# Patient Record
Sex: FEMALE | Race: WHITE | URBAN - NONMETROPOLITAN AREA
[De-identification: names, ages, dates, MRNs, and addresses within clinical notes are randomized per-mention and may not be internally consistent; named-entity substitution may affect disease eponyms.]

---

## 2017-05-24 ENCOUNTER — COMMUNICATION - GICH (OUTPATIENT)
Dept: FAMILY MEDICINE | Facility: OTHER | Age: 47
End: 2017-05-24

## 2017-05-24 DIAGNOSIS — Z79.890 HORMONE REPLACEMENT THERAPY (POSTMENOPAUSAL): ICD-10-CM

## 2017-05-24 DIAGNOSIS — N95.1 FEMALE CLIMACTERIC STATE: ICD-10-CM

## 2017-05-24 DIAGNOSIS — F41.1 GENERALIZED ANXIETY DISORDER: ICD-10-CM

## 2017-08-09 ENCOUNTER — HISTORY (OUTPATIENT)
Dept: FAMILY MEDICINE | Facility: OTHER | Age: 47
End: 2017-08-09

## 2017-08-09 ENCOUNTER — OFFICE VISIT - GICH (OUTPATIENT)
Dept: FAMILY MEDICINE | Facility: OTHER | Age: 47
End: 2017-08-09

## 2017-08-09 DIAGNOSIS — F41.1 GENERALIZED ANXIETY DISORDER: ICD-10-CM

## 2017-08-09 DIAGNOSIS — Z12.11 ENCOUNTER FOR SCREENING FOR MALIGNANT NEOPLASM OF COLON: ICD-10-CM

## 2017-08-09 DIAGNOSIS — Z79.890 HORMONE REPLACEMENT THERAPY (POSTMENOPAUSAL): ICD-10-CM

## 2017-08-09 DIAGNOSIS — N95.1 FEMALE CLIMACTERIC STATE: ICD-10-CM

## 2017-08-09 DIAGNOSIS — G47.00 INSOMNIA: ICD-10-CM

## 2017-08-10 ENCOUNTER — COMMUNICATION - GICH (OUTPATIENT)
Dept: FAMILY MEDICINE | Facility: OTHER | Age: 47
End: 2017-08-10

## 2017-08-10 ENCOUNTER — HISTORY (OUTPATIENT)
Dept: FAMILY MEDICINE | Facility: OTHER | Age: 47
End: 2017-08-10

## 2017-12-28 NOTE — TELEPHONE ENCOUNTER
Patient Information     Patient Name MRN Sex Lisa Domínguez [id number] Female 1970      Telephone Encounter by Yani West at 8/10/2017  9:15 AM     Author:  Yani West Service:  (none) Author Type:  (none)     Filed:  8/10/2017  9:21 AM Encounter Date:  8/10/2017 Status:  Signed     :  Yani West            Spoke with Lisa ,  She will call in January to schedule the colonoscopy.

## 2017-12-28 NOTE — PROGRESS NOTES
"Patient Information     Patient Name MRN Sex     Lisa Dyer [id number] Female 1970      Progress Notes by Sara Geiger DO at 2017  9:15 AM     Author:  Sara Geiger DO Service:  (none) Author Type:  PHYS- Osteopathic     Filed:  8/10/2017  6:53 AM Encounter Date:  2017 Status:  Signed     :  Sara Geiger DO (PHYS- Osteopathic)            Nursing Notes:   Buffy Forman  2017  9:28 AM  Signed  Patient states she has been having a lot of trouble sleeping.  Buffy Forman LPN............................... 2017 9:21 AM      ANNUAL PHYSICAL - FEMALE    HPI: Lisa Dyer is a 47 y.o. female who presents for a yearly exam.  Concerns include:  1. Insomnia.  She continues to have difficulty sleeping.  She has been using alprazolam 0.5-1 mg by mouth at bedtime. She states when she started this medication it \"knocked me out.\" However now she describes that it takes a little bit of time for it to work, however she still does get sleepy enough to help her with sleep. She is worried about tolerance, and having to increase her dose. She states she will never get to the point where she has to take 1 mg. She would like to discuss possible other options at this time, especially to medications that would not cause increased tolerance. We reviewed in the past that she has tried trazodone, Remeron, Lunesta, Ambien, temazepam. I discussed a possible consult with a sleep specialist, however she states \"I have tried everything, and don't know what they would have to offer.\"  2. Also discussed HRT. She has been on low-dose Estrogen for the last ~5 years after her hysterectomy and resulting oophorectomy later that year.  She has never required higher dosing.  She has never gone without her estrogen. She is open to discussion re: weaning compared to last year when she threatened to buy it off the \"black market\" if she couldn't get it anymore.    No LMP recorded. Patient has had a " hysterectomy.   Contraception: s/p hyst  Risk for STI?: No  Last pap: prior to hysterectomy  Any hx of abnormal paps:  No  FH of early CA?: No; paternal aunt with colon CA at age 62.  Cholesterol/DM concerns/screening: UTD, normal.  Tobacco?: No  Calcium intake: No  DEXA: NA  Last mammo: 2016  Colonoscopy: NA; discussed being due in January at age 48.  Immunizations: Tdap 2014; recommended yearly flu vaccine.  Also discussed Zostavax due after  - and recommended to receive it at her pharmacy.  No indication for pneumonia vaccine at this time.      Patient Active Problem List       Diagnosis  Date Noted     Controlled substance agreement signed  2015     Alprazolam 1mg #30/month  Dx: insomnia        Hormone replacement therapy  2015     Estradiol 0.5mg tablet daily  Goal: wean off at age 48        INSOMNIA, PERSISTENT  02/10/2011     ANXIETY         Past Medical History:     Diagnosis  Date     History of OCD (obsessive compulsive disorder)     patient diagnosis/was told probably body dysmorphic syndrome        Past Surgical History:      Procedure  Laterality Date     BUNIONECTOMY      right        SECTION              HERNIA REPAIR      right, inguinal       Right fourth finger repair      tendon repair       SALPINGO-OOPHORECTOMY  2014    laparoscopic       VAGINAL HYSTERECTOMY  2013    menorrhagia         Social History     Social History        Marital status:       Spouse name: N/A     Number of children:  1     Years of education:  N/A     Occupational History        [employer]     Social History Main Topics          Smoking status:   Never Smoker      Smokeless tobacco:   Never Used      Alcohol use   Yes      Comment: some       Drug use:   No      Sexual activity:   Yes      Partners:  Male      Other Topics  Concern     Not on file      Social History Narrative     The patient is .  Her spouse is blind and has diabetes.  However,  "his blindness was from a hunting accident.  She has one daughter who lives with her.  She works at [employer]       Family History       Problem   Relation Age of Onset     Hypertension  Mother      angioplasty x 3       Other  Mother      occluded vein in leg/carotid endarterectomy       Diabetes  Maternal Grandfather              Heart Disease  Paternal Grandmother      Thyroid Disease  Maternal Aunt      hypothyroid       Other  Brother      anxiety       Cancer-breast  Paternal Aunt        Current Outpatient Prescriptions       Medication  Sig Dispense Refill     ALPRAZolam (XANAX) 1 mg tablet Take 1 tablet by mouth at bedtime. For insomnia. 30 tablet 5     estradiol (ESTRACE) 0.5 mg tablet Take 1 tablet by mouth once daily. 90 tablet 4     FLUoxetine (PROZAC) 10 mg capsule Take 3 capsules by mouth every morning. 270 capsule 4     No current facility-administered medications for this visit.      Medications have been reviewed by me and are current to the best of my knowledge and ability.       REVIEW OF SYSTEMS:  Refer to HPI; all other systems reviewed and negative.    PHYSICAL EXAM:  /58  Pulse 60  Ht 1.683 m (5' 6.25\")  Wt 58.7 kg (129 lb 6.4 oz)  BMI 20.73 kg/m2  CONSTITUTIONAL:  Alert, cooperative, NAD.  EYES: No scleral icterus.  PERRLA.  Conjunctiva clear.  ENT/MOUTH: External ears and nose normal.  TMs normal.  Moist mucous membranes. Oropharynx clear.    ENDO: No thyromegaly or thyroid nodules.  LYMPH:  No cervical or supraclavicular LA.    BREASTS: Symmetric.  No lesions, puckering.  No palpable masses or abnormalities in breast tissue b/l.  CARDIOVASCULAR: Regular, S1, S2.  No S3 or S4.  No murmur/gallop/rub.  No peripheral edema.  RESPIRATORY: CTA bilaterally, no wheezes, rhonchi or rales.  GI: Bowel sounds wnl.  Soft, nontender, nondistended.  No masses or HSM.  No rebound or guarding.  : Vulva: normal, no lesions or discharge.  Mild atrophy noted.  Urethral meatus: normal size and " location, no lesions or discharge  Urethra: no tenderness or masses  Bladder: no fullness or tenderness  Vagina: normal appearance, no abnormal discharge, no lesions.  No evidence of large cystocele or rectocele.  Pap smear obtained: No  MSKEL: Grossly normal ROM.  No clubbing.  INTEGUMENTARY:  Warm, dry.  No rash noted on exposed skin.  NEUROLOGIC: Facies symmetric.  Grossly normal movement and tone.  No tremor.  PSYCHIATRIC: Affect normal.  Speech fluent.      PHQ Depression Screen  Date of PHQ exam: 08/09/17  Over the last 2 weeks, how often have you been bothered by any of the following problems?  1. Little interest or pleasure in doing things: 0 - Not at all  2. Feeling down, depressed, or hopeless: 0 - Not at all      ASSESSMENT/PLAN:    ICD-10-CM    1. Hormone replacement therapy Z79.890 estradiol (ESTRACE) 0.5 mg tablet   2. Insomnia, unspecified type G47.00 ALPRAZolam (XANAX) 1 mg tablet   3. Generalized anxiety disorder F41.1 FLUoxetine (PROZAC) 10 mg capsule   4. Menopausal syndrome (hot flashes) N95.1 FLUoxetine (PROZAC) 10 mg capsule       Relevant cancer screening discussed.  Colonoscopy ordered - to be scheduled in JANUARY.  Will get letter from Radiology soon re: mammogram due next month.  Counseled on healthy diet, Calcium and vitamin D intake, and   exercise.    HRT, chronic, stable:  Discussed weaning within the next few years.  I recommended cutting down in half daily and if tolerates it well, could go off completely due to her low dose.  She states she has some stressful things to think about coming up - they have considered moving and want to get through some of the decisions before she worries about her wean.    Insomnia, chronic, unchanged:  It was discussed that other sleep aids are nearly just as similar for tolerance building as the benzodiazepines.  We could consider an amitriptyline type medication, however for now - recommended to stay on her Xanax at bedtime.      Anxiety, chronic,  stable: most symptoms stem from having to do all of the yard work and other household responsibilities due to her 's blindness (hunting accident).  Stable for now, will resume same dose of her Prozac.      JENNIFER POMPA, DO

## 2017-12-30 NOTE — NURSING NOTE
Patient Information     Patient Name MRN Lisa Mcfarlane [id number] Female 1970      Nursing Note by Buffy Forman at 2017  9:15 AM     Author:  Buffy Forman Service:  (none) Author Type:  (none)     Filed:  2017  9:28 AM Encounter Date:  2017 Status:  Signed     :  Buffy Forman            Patient states she has been having a lot of trouble sleeping.  Buffy Forman LPN............................... 2017 9:21 AM

## 2018-01-02 ENCOUNTER — COMMUNICATION - GICH (OUTPATIENT)
Dept: SURGERY | Facility: OTHER | Age: 48
End: 2018-01-02

## 2018-01-02 DIAGNOSIS — Z00.00 ENCOUNTER FOR GENERAL ADULT MEDICAL EXAMINATION WITHOUT ABNORMAL FINDINGS: ICD-10-CM

## 2018-01-05 NOTE — TELEPHONE ENCOUNTER
Patient Information     Patient Name MRN Sex Lisa Domínguez [id number] Female 1970      Telephone Encounter by Ellie Hyatt RN at 2017  3:52 PM     Author:  Ellie Hyatt RN Service:  (none) Author Type:  NURS- Registered Nurse     Filed:  2017  3:55 PM Encounter Date:  2017 Status:  Signed     :  Ellie Hyatt RN (NURS- Registered Nurse)            Depression-in adults 18 and over  SSRI  Office visit in the past 12 months or as indicated in chart.  Should have clinic visit 1-2 months after initial prescription.  Last visit with JENNIFER POMPA was on: 2016 in [facility]  Next visit with JENNIFER POMPA is on: No future appointment listed with this provider  Next visit with Family Practice is on: No future appointment listed in this department  Max refills 12 months from last office visit or per providers notes.   Due for exam.  Limited refill per protocol and letter mailed.  Ellie Hyatt RN ........   2017    3:53 PM    Hormones  Office visit in the past 12 months or per provider note.  Last visit with JENNIFER POMPA was on: 2016 in [facility]  Next visit with JENNIFER POMPA is on: No future appointment listed with this provider  Next visit with Family Practice is on: No future appointment listed in this department  Max refill for 12 months from last office visit or per provider note.  Due for exam.  Limited refill per protocol and letter mailed.  Ellie Hyatt RN ........   2017    3:53 PM

## 2018-01-27 VITALS
BODY MASS INDEX: 20.79 KG/M2 | HEART RATE: 60 BPM | WEIGHT: 129.4 LBS | SYSTOLIC BLOOD PRESSURE: 100 MMHG | HEIGHT: 66 IN | DIASTOLIC BLOOD PRESSURE: 58 MMHG

## 2018-02-12 NOTE — TELEPHONE ENCOUNTER
Patient Information     Patient Name MRN Sex Lisa Domínguez [id number] Female 1970      Telephone Encounter by Yani West at 2018  1:19 PM     Author:  Yani West Service:  (none) Author Type:  (none)     Filed:  2018  1:21 PM Encounter Date:  2018 Status:  Signed     :  Yani West            Screening Questions for the Scheduling of Screening Colonoscopies   (If Colonoscopy is diagnostic, Provider should review the chart before scheduling.)  Are you younger than 50 or older than 80?  NO   Do you take aspirin or fish oil?  NO (if yes, tell patient to stop 1 week prior to Colonoscopy)  Do you take warfarin (Coumadin), clopidogrel (Plavix), apixaban (Eliquis), dabigatram (Pradaxa), rivaroxaban (Xarelto) or any blood thinner? NO  Do you use oxygen at home?  NO  Do you have kidney disease? NO  Are you on dialysis? NO  Have you had a stroke or heart attack in the last year? NO  Have you had a stent in your heart or any blood vessel in the last year? NO  Have you had a transplant of any organ? NO  Have you had a colonoscopy or upper endoscopy (EGD) before? NO          When?  NO   Date of scheduled Colonoscopy. 2018  Provider ROGERIO Perez GLOBE

## 2018-03-24 ENCOUNTER — HEALTH MAINTENANCE LETTER (OUTPATIENT)
Age: 48
End: 2018-03-24

## 2018-04-03 DIAGNOSIS — G47.00 INSOMNIA, UNSPECIFIED TYPE: Primary | ICD-10-CM

## 2018-04-04 RX ORDER — ALPRAZOLAM 1 MG
TABLET ORAL
Qty: 30 TABLET | Refills: 5 | Status: SHIPPED | OUTPATIENT
Start: 2018-04-04 | End: 2018-09-06

## 2018-04-04 NOTE — TELEPHONE ENCOUNTER
Xanax 1 mg  LOV-08/09/2017    Routing refill request to provider for review/approval because: Drug not on the G refill protocol     Unable to complete prescription refill per RNMedication Refill Policy.................... Ellie Hyatt ....................  4/4/2018   4:11 PM

## 2018-04-23 RX ORDER — FLUOXETINE 10 MG/1
CAPSULE ORAL
COMMUNITY
Start: 2017-08-09 | End: 2018-09-06

## 2018-04-23 RX ORDER — ESTRADIOL 0.5 MG/1
0.5 TABLET ORAL
COMMUNITY
Start: 2017-08-09 | End: 2018-09-06

## 2018-04-25 PROBLEM — Z00.00 HEALTHCARE MAINTENANCE: Status: ACTIVE | Noted: 2018-04-25

## 2018-04-25 PROBLEM — F41.1 ANXIETY STATE: Status: ACTIVE | Noted: 2018-04-25

## 2018-04-30 ENCOUNTER — ANESTHESIA EVENT (OUTPATIENT)
Dept: SURGERY | Facility: OTHER | Age: 48
End: 2018-04-30
Payer: COMMERCIAL

## 2018-04-30 ENCOUNTER — SURGERY (OUTPATIENT)
Age: 48
End: 2018-04-30

## 2018-04-30 ENCOUNTER — HOSPITAL ENCOUNTER (OUTPATIENT)
Facility: OTHER | Age: 48
Discharge: HOME | End: 2018-04-30
Attending: SURGERY | Admitting: SURGERY
Payer: COMMERCIAL

## 2018-04-30 ENCOUNTER — ANESTHESIA (OUTPATIENT)
Dept: SURGERY | Facility: OTHER | Age: 48
End: 2018-04-30
Payer: COMMERCIAL

## 2018-04-30 VITALS
WEIGHT: 120 LBS | RESPIRATION RATE: 16 BRPM | OXYGEN SATURATION: 98 % | BODY MASS INDEX: 19.29 KG/M2 | SYSTOLIC BLOOD PRESSURE: 120 MMHG | HEART RATE: 55 BPM | HEIGHT: 66 IN | DIASTOLIC BLOOD PRESSURE: 71 MMHG | TEMPERATURE: 97 F

## 2018-04-30 PROBLEM — K63.5 COLON POLYPS: Status: ACTIVE | Noted: 2018-04-30

## 2018-04-30 PROCEDURE — 88305 TISSUE EXAM BY PATHOLOGIST: CPT

## 2018-04-30 PROCEDURE — 25000128 H RX IP 250 OP 636: Performed by: NURSE ANESTHETIST, CERTIFIED REGISTERED

## 2018-04-30 PROCEDURE — 25000125 ZZHC RX 250: Performed by: NURSE ANESTHETIST, CERTIFIED REGISTERED

## 2018-04-30 PROCEDURE — 45384 COLONOSCOPY W/LESION REMOVAL: CPT | Mod: PT | Performed by: SURGERY

## 2018-04-30 PROCEDURE — 45380 COLONOSCOPY AND BIOPSY: CPT | Performed by: SURGERY

## 2018-04-30 PROCEDURE — 40000010 ZZH STATISTIC ANES STAT CODE-CRNA PER MINUTE: Performed by: SURGERY

## 2018-04-30 PROCEDURE — 25000128 H RX IP 250 OP 636: Performed by: SURGERY

## 2018-04-30 PROCEDURE — 27210995 ZZH RX 272: Performed by: SURGERY

## 2018-04-30 PROCEDURE — 25000132 ZZH RX MED GY IP 250 OP 250 PS 637: Performed by: SURGERY

## 2018-04-30 PROCEDURE — 45384 COLONOSCOPY W/LESION REMOVAL: CPT | Performed by: NURSE ANESTHETIST, CERTIFIED REGISTERED

## 2018-04-30 RX ORDER — PROPOFOL 10 MG/ML
INJECTION, EMULSION INTRAVENOUS CONTINUOUS PRN
Status: DISCONTINUED | OUTPATIENT
Start: 2018-04-30 | End: 2018-04-30

## 2018-04-30 RX ORDER — SODIUM CHLORIDE, SODIUM LACTATE, POTASSIUM CHLORIDE, CALCIUM CHLORIDE 600; 310; 30; 20 MG/100ML; MG/100ML; MG/100ML; MG/100ML
INJECTION, SOLUTION INTRAVENOUS CONTINUOUS
Status: DISCONTINUED | OUTPATIENT
Start: 2018-04-30 | End: 2018-04-30 | Stop reason: HOSPADM

## 2018-04-30 RX ORDER — PROPOFOL 10 MG/ML
INJECTION, EMULSION INTRAVENOUS PRN
Status: DISCONTINUED | OUTPATIENT
Start: 2018-04-30 | End: 2018-04-30

## 2018-04-30 RX ORDER — LIDOCAINE HYDROCHLORIDE 20 MG/ML
INJECTION, SOLUTION INFILTRATION; PERINEURAL PRN
Status: DISCONTINUED | OUTPATIENT
Start: 2018-04-30 | End: 2018-04-30

## 2018-04-30 RX ORDER — ONDANSETRON 2 MG/ML
4 INJECTION INTRAMUSCULAR; INTRAVENOUS
Status: DISCONTINUED | OUTPATIENT
Start: 2018-04-30 | End: 2018-04-30 | Stop reason: HOSPADM

## 2018-04-30 RX ORDER — SIMETHICONE
LIQUID (ML) MISCELLANEOUS PRN
Status: DISCONTINUED | OUTPATIENT
Start: 2018-04-30 | End: 2018-04-30 | Stop reason: HOSPADM

## 2018-04-30 RX ORDER — NALOXONE HYDROCHLORIDE 0.4 MG/ML
.1-.4 INJECTION, SOLUTION INTRAMUSCULAR; INTRAVENOUS; SUBCUTANEOUS
Status: DISCONTINUED | OUTPATIENT
Start: 2018-04-30 | End: 2018-04-30 | Stop reason: HOSPADM

## 2018-04-30 RX ORDER — LIDOCAINE 40 MG/G
CREAM TOPICAL
Status: DISCONTINUED | OUTPATIENT
Start: 2018-04-30 | End: 2018-04-30 | Stop reason: HOSPADM

## 2018-04-30 RX ORDER — FLUMAZENIL 0.1 MG/ML
0.2 INJECTION, SOLUTION INTRAVENOUS
Status: DISCONTINUED | OUTPATIENT
Start: 2018-04-30 | End: 2018-04-30 | Stop reason: HOSPADM

## 2018-04-30 RX ADMIN — WATER 150 ML: 1 IRRIGANT IRRIGATION at 07:37

## 2018-04-30 RX ADMIN — Medication 1.5 ML: at 07:35

## 2018-04-30 RX ADMIN — PROPOFOL 135 MCG/KG/MIN: 10 INJECTION, EMULSION INTRAVENOUS at 07:30

## 2018-04-30 RX ADMIN — SODIUM CHLORIDE, SODIUM LACTATE, POTASSIUM CHLORIDE, AND CALCIUM CHLORIDE: 600; 310; 30; 20 INJECTION, SOLUTION INTRAVENOUS at 07:03

## 2018-04-30 RX ADMIN — PROPOFOL 70 MG: 10 INJECTION, EMULSION INTRAVENOUS at 07:30

## 2018-04-30 RX ADMIN — LIDOCAINE HYDROCHLORIDE 40 MG: 20 INJECTION, SOLUTION INFILTRATION; PERINEURAL at 07:30

## 2018-04-30 NOTE — ANESTHESIA PREPROCEDURE EVALUATION
Anesthesia Evaluation     .             ROS/MED HX    ENT/Pulmonary:  - neg pulmonary ROS     Neurologic:  - neg neurologic ROS     Cardiovascular:  - neg cardiovascular ROS       METS/Exercise Tolerance:  >4 METS   Hematologic:  - neg hematologic  ROS       Musculoskeletal:  - neg musculoskeletal ROS       GI/Hepatic:  - neg GI/hepatic ROS       Renal/Genitourinary:  - ROS Renal section negative       Endo:  - neg endo ROS       Psychiatric:     (+) psychiatric history anxiety      Infectious Disease:  - neg infectious disease ROS       Malignancy:      - no malignancy   Other:    - neg other ROS                 Physical Exam  Normal systems: cardiovascular, pulmonary and dental    Airway   Mallampati: I  TM distance: >3 FB  Neck ROM: full    Dental     Cardiovascular   Rhythm and rate: regular and normal      Pulmonary    breath sounds clear to auscultation                    Anesthesia Plan      History & Physical Review      ASA Status:  2 .    NPO Status:  > 6 hours    Plan for MAC          Postoperative Care      Consents  Anesthetic plan, risks, benefits and alternatives discussed with:  Patient..                          .

## 2018-04-30 NOTE — IP AVS SNAPSHOT
[facility]    [address]    [place] [address]    Phone:  [phone]    Fax:  [phone]                                       After Visit Summary   4/30/2018    Lisa Dyer    MRN: [id number]           After Visit Summary Signature Page     I have received my discharge instructions, and my questions have been answered. I have discussed any challenges I see with this plan with the nurse or doctor.    ..........................................................................................................................................  Patient/Patient Representative Signature      ..........................................................................................................................................  Patient Representative Print Name and Relationship to Patient    ..................................................               ................................................  Date                                            Time    ..........................................................................................................................................  Reviewed by Signature/Title    ...................................................              ..............................................  Date                                                            Time

## 2018-04-30 NOTE — OR NURSING
pt has been discharged to home at 1025 via ambulatory accompanied by neighbor    Written discharge instructions were provided to patient.  Prescriptions were N/A.      Patient and adult caring for them verbalize understanding of discharge instructions including no driving until tomorrow and no longer taking narcotic pain medications - no operating mechanical equipment and no making any important decisions.They understand reason for discharge, and necessary follow-up appointments.      Jennifer Triana RN

## 2018-04-30 NOTE — ANESTHESIA POSTPROCEDURE EVALUATION
Patient: Lisa Dyer    Procedure(s):  COLONOSCOPY - Wound Class: III-Contaminated    Diagnosis:screening  Diagnosis Additional Information: No value filed.    Anesthesia Type:  MAC    Note:  Anesthesia Post Evaluation    Patient location during evaluation: Phase 2  Patient participation: Able to fully participate in evaluation  Level of consciousness: awake and alert  Pain management: adequate  Airway patency: patent  Cardiovascular status: acceptable  Respiratory status: acceptable  Hydration status: acceptable  PONV: none             Last vitals:  Vitals:    04/30/18 0815 04/30/18 0830 04/30/18 0845   BP: 91/82 120/59    Pulse:      Resp:      Temp:      SpO2: 99% 98% 97%         Electronically Signed By: PATRICK OHPPER CRNA  April 30, 2018  8:48 AM

## 2018-04-30 NOTE — H&P
"History and Physical    CHIEF COMPLAINT / REASON FOR PROCEDURE:  Healthcare maintenance     PERTINENT HISTORY   Patient is a 48 year old female who presents today for colonoscopy for Healthcare maintenance .   Family history colon cancer in Aunt.  Patient has no complaints.    Past Medical History:   Diagnosis Date     Personal history of other mental and behavioral disorders     patient diagnosis/was told probably body dysmorphic syndrome     Past Surgical History:   Procedure Laterality Date     BUNIONECTOMY      right      SECTION      No Comments Provided     HERNIA REPAIR Right     inguinal repair     HYSTERECTOMY VAGINAL  2013,menorrhagia     OTHER SURGICAL HISTORY      [id number],OTHER,tendon repair     REPAIR TENDON FINGER(S)      right fourth finger      SALPINGO-OOPHORECTOMY BILATERAL  2014,laparoscopic       Bleeding tendencies:  No    ALLERGIES/SENSITIVITIES:   Allergies   Allergen Reactions     Meperidine Nausea and Vomiting        CURRENT MEDICATIONS:    Current Facility-Administered Medications   Medication     lactated ringers infusion     lidocaine (LMX4) cream     lidocaine 1 % 1 mL     ondansetron (ZOFRAN) injection 4 mg     sodium chloride (PF) 0.9% PF flush 3 mL     sodium chloride (PF) 0.9% PF flush 3 mL       Physical Exam:  /72  Pulse 55  Temp 98.6  F (37  C) (Temporal)  Resp 16  Ht 1.676 m (5' 6\")  Wt 54.4 kg (120 lb)  LMP   SpO2 98%  Breastfeeding? No  BMI 19.37 kg/m2  EXAM:  Chest/Respiratory Exam: Normal - Clear to auscultation without rales, rhonchi, or wheezing.  Cardiovascular Exam: normal        PLAN: COLONOSCOPY .  Patient understands risks of bleeding, perforation, potential inability to reach cecum, aspiration and wishes to proceed. MAC needed for chronic benzodiazepine use.  "

## 2018-04-30 NOTE — ANESTHESIA CARE TRANSFER NOTE
Patient: Lisa Dyer    Procedure(s):  COLONOSCOPY - Wound Class: III-Contaminated    Diagnosis: screening  Diagnosis Additional Information: No value filed.    Anesthesia Type:   MAC     Note:  Airway :Room Air  Patient transferred to:Phase II  Handoff Report: Identifed the Patient, Identified the Reponsible Provider, Reviewed the pertinent medical history, Discussed the surgical course, Reviewed Intra-OP anesthesia mangement and issues during anesthesia, Set expectations for post-procedure period and Allowed opportunity for questions and acknowledgement of understanding      Vitals: (Last set prior to Anesthesia Care Transfer)    CRNA VITALS  4/30/2018 0723 - 4/30/2018 0756      4/30/2018             Resp Rate (set): 10                Electronically Signed By: PATRICK HOPPER CRNA  April 30, 2018  7:56 AM

## 2018-04-30 NOTE — IP AVS SNAPSHOT
MRN:[id number]                      After Visit Summary   4/30/2018    Lisa Dyer    MRN: [id number]           Thank you!     Thank you for choosing [facility] for your care. Our goal is always to provide you with excellent care. Hearing back from our patients is one way we can continue to improve our services. Please take a few minutes to complete the written survey that you may receive in the mail after you visit with us. Thank you!        Patient Information     Date Of Birth          1970        About your hospital stay     You were admitted on:  April 30, 2018 You last received care in the:  [facility]    You were discharged on:  April 30, 2018       Who to Call     For medical emergencies, please call 911.  For non-urgent questions about your medical care, please call your primary care provider or clinic, [phone]  For questions related to your surgery, please call your surgery clinic        Attending Provider     Provider Specialty    Zhen Brennan MD Surgery       Primary Care Provider Office Phone # Fax #    Sara Geiger [phone]      After Care Instructions     Discharge Instructions       No driving or operating machinery until the day after procedure..postfiber            Discharge Instructions       Resume pre procedure diet and medications.  Your doctor recommends that you eat 25 to 30 Grams of fiber daily. The following are some examples of fiber amounts in different foods.    Fruits: Apple (with skin) 1 medium = 4.4 Grams   Banana      1 medium = 3.1 Grams   Oranges     1 orange = 3.1 Grams   Prunes     1 cup, pitted = 12.4 Grams    Juices: Apple, unsweetened w/ added ascorbic acid  1 cup = 0.5 Grams    Grapefruit, white, canned,sweetened  1 cup = 0.2 Grams    Grape, unsweetened w/ added ascorbic acid  1 cup = 0.5 Grams    Orange     1 cup = 0.7 Grams    Vegetables:   Cooked: Green Beans   1 cup = 4.0 Grams       Carrots    "1/2 cup sliced = 2.3 Grams       Peas       1 cup = 8.8 Grams       Potato (baked, with skin)  1 medium = 3.8 Grams    Raw: Cucumber (with peel)  1 cucumber = 1.5 Grams            Lettuce     1 cup shredded = 0.5 Grams            Tomato   1 medium tomato = 1.5 Grams            Spinach  1 cup = 0.7 Grams    Legumes: Baked beans, canned, no salt added  1 cup = 13.9 Grams         Kidney Beans, canned  1 cup = 13.6 Grams         Andujar Beans, canned     1 cup = 11.6 Grams         Lentils, boiled   1 cup = 15.6 Grams    Breads, Pastas, Flours: Bran muffins   1 medium muffin = 5.2 Grams           Oatmeal, cooked  1 cup = 4.0 Grams           White Bread   1 slice = 0.6 Grams           Whole- wheat bread = 1.9 Grams    Pasta and rice, cooked: Macaroni  1 cup = 2.5 Grams           Rice, Brown  1 cup = 3.5 Grams           Rice, white   1 cup = 0.6 Grams           Spaghetti (regular) 1 cup = 2.5 Grams    Nuts: Almonds   1 cup = 17.4 Grams            Peanuts    1 cup = 12.4 Grams            Discharge Instructions       Call [phone] for questions or concerns. Call for increased abdominal pain, rectal bleeding, persistent vomiting or fever over 101.5 F  You will receive a letter in about one week with results.                  Pending Results     No orders found from 4/28/2018 to 5/1/2018.            Admission Information     Date & Time Provider Department Dept. Phone    4/30/2018 Zhen Brennan MD [facility] [phone]      Your Vitals Were     Blood Pressure Pulse Temperature Respirations Height Weight    91/82 55 97  F (36.1  C) (Temporal) 14 1.676 m (5' 6\") 54.4 kg (120 lb)    Pulse Oximetry BMI (Body Mass Index)                99% 19.37 kg/m2          MyChart Information     [facility] lets you send messages to your doctor, view your test results, renew your prescriptions, schedule appointments and more. To sign up, go to [email or web address] . Click on \"Log in\" on the left side of the screen, which " "will take you to the Welcome page. Then click on \"Sign up Now\" on the right side of the page.     You will be asked to enter the access code listed below, as well as some personal information. Please follow the directions to create your username and password.     Your access code is: [email or web address]  Expires: 2018  8:22 AM     Your access code will  in 90 days. If you need help or a new code, please call your [facility] clinic or [phone].        Care EveryWhere ID     This is your Care EveryWhere ID. This could be used by other organizations to access your [facility] medical records  [id number]        Equal Access to Services     SAMIA ZARAGOZA : Eugene Sun, matilda castillo, tho dc, bettie huerta. So [facility] [phone].    ATENCIÓN: Si habla español, tiene a boswell disposición servicios gratuitos de asistencia lingüística. Llame al [phone].    We comply with applicable federal civil rights laws and [place] laws. We do not discriminate on the basis of race, color, national origin, age, disability, sex, sexual orientation, or gender identity.               Review of your medicines      UNREVIEWED medicines. Ask your doctor about these medicines        Dose / Directions    ALPRAZolam 1 MG tablet   Commonly known as:  XANAX   Used for:  Insomnia, unspecified type        TAKE 1 TABLET BY MOUTH NIGHTLY AT BEDTIME FOR INSOMNIA   Quantity:  30 tablet   Refills:  5       estradiol 0.5 MG tablet   Commonly known as:  ESTRACE        Dose:  0.5 mg   Take 0.5 mg by mouth   Refills:  0       FLUoxetine 10 MG capsule   Commonly known as:  PROzac        Take 3 capsules by mouth every morning.   Refills:  0                Protect others around you: Learn how to safely use, store and throw away your medicines at www.disposemymeds.org.             Medication List: This is a list of all your medications and when to take them. Check marks below indicate your " daily home schedule. Keep this list as a reference.      Medications           Morning Afternoon Evening Bedtime As Needed    ALPRAZolam 1 MG tablet   Commonly known as:  XANAX   TAKE 1 TABLET BY MOUTH NIGHTLY AT BEDTIME FOR INSOMNIA                                estradiol 0.5 MG tablet   Commonly known as:  ESTRACE   Take 0.5 mg by mouth                                FLUoxetine 10 MG capsule   Commonly known as:  PROzac   Take 3 capsules by mouth every morning.

## 2018-04-30 NOTE — OP NOTE
PROCEDURE NOTE    DATE OF SERVICE: 4/30/2018    SURGEON: Zhen Brennan MD    PRE-OP DIAGNOSIS:    Healthcare maintenance       POST-OP DIAGNOSIS:    Polyps at 15 cm     PROCEDURE:   Colonoscopy with hot biopsy    ANESTHESIA:  DANIKA Bolaños CRNA    INDICATION FOR THE PROCEDURE: The patient is a 48 year old female in need of Healthcare maintenance  . The patient has no other complaints  . After explaining the risks to include bleeding, perforation, potential inability toreach the cecum, the patient wished to proceed.    PROCEDURE:After adequate sedation, the patient was in the left lateral decubitus position.  Rectal exam was performed.  There was normal tone and no palpable masses .  The colonoscope was introduced into the rectum and advanced to the cecum with Mild difficulty.  The patient's prep was excellent.  The terminal cecum was reached.  The cecum, ascending, transverse, descending and sigmoid colon was with two diminutive polyps at 15 cm that were hot biopsied and destroyed .  The scope was retroflexed in the rectum.  The rectum was unremarkable  .  The scope was straightened and removed.  The patient tolerated the procedure well.     ESTIMATED BLOOD LOSS: none    COMPLICATIONS:  None    TISSUE REMOVED:  Yes    RECOMMEND:    Follow-up pending pathology      Zhen Brennan MD FACS

## 2018-05-02 PROBLEM — K63.5 COLON POLYPS: Status: RESOLVED | Noted: 2018-04-30 | Resolved: 2018-05-02

## 2018-07-23 NOTE — PROGRESS NOTES
Patient Information     Patient Name  Lisa Dyer MRN  [id number] Sex  Female   1970      Letter by Sara Pompa DO at      Author:  Sara Pompa DO Service:  (none) Author Type:  (none)    Filed:   Encounter Date:  2017 Status:  (Other)           Lisa Dyer  [address] [facility] [address]          May 24, 2017    Dear Ms. Dyer:    A 90 day refill of FLUoxetine (PROZAC) 10 mg capsule and Estradiol (ESTRACE) 0.5 mg tablet have been called into your pharmacy.    Additional refills require an annual office visit with SARA POMPA DO.   Please call the clinic at [phone] to schedule your appointment.    If you should require additional refills before your scheduled appointment, please contact your pharmacy and we will refill your medication until that date.      Thank you,    The Refill Nurse  [facility]

## 2018-09-06 ENCOUNTER — OFFICE VISIT (OUTPATIENT)
Dept: FAMILY MEDICINE | Facility: OTHER | Age: 48
End: 2018-09-06
Attending: FAMILY MEDICINE
Payer: COMMERCIAL

## 2018-09-06 VITALS
BODY MASS INDEX: 20.09 KG/M2 | HEIGHT: 66 IN | HEART RATE: 64 BPM | SYSTOLIC BLOOD PRESSURE: 102 MMHG | WEIGHT: 125 LBS | DIASTOLIC BLOOD PRESSURE: 70 MMHG

## 2018-09-06 DIAGNOSIS — Z00.00 ROUTINE HISTORY AND PHYSICAL EXAMINATION OF ADULT: Primary | ICD-10-CM

## 2018-09-06 DIAGNOSIS — Z13.1 SCREENING FOR DIABETES MELLITUS: ICD-10-CM

## 2018-09-06 DIAGNOSIS — N95.1 SYMPTOMATIC MENOPAUSAL OR FEMALE CLIMACTERIC STATES: ICD-10-CM

## 2018-09-06 DIAGNOSIS — F33.1 MODERATE EPISODE OF RECURRENT MAJOR DEPRESSIVE DISORDER (H): ICD-10-CM

## 2018-09-06 DIAGNOSIS — Z23 NEED FOR SHINGLES VACCINE: ICD-10-CM

## 2018-09-06 DIAGNOSIS — G47.00 INSOMNIA, UNSPECIFIED TYPE: ICD-10-CM

## 2018-09-06 DIAGNOSIS — Z13.220 LIPID SCREENING: ICD-10-CM

## 2018-09-06 DIAGNOSIS — F41.1 ANXIETY STATE: ICD-10-CM

## 2018-09-06 DIAGNOSIS — Z12.31 ENCOUNTER FOR SCREENING MAMMOGRAM FOR BREAST CANCER: ICD-10-CM

## 2018-09-06 PROBLEM — D18.01 CHERRY ANGIOMA: Status: ACTIVE | Noted: 2018-09-06

## 2018-09-06 PROBLEM — L81.4 AGE SPOTS: Status: ACTIVE | Noted: 2018-09-06

## 2018-09-06 LAB
CHOLEST SERPL-MCNC: 208 MG/DL
GLUCOSE SERPL-MCNC: 70 MG/DL (ref 70–105)
HDLC SERPL-MCNC: 85 MG/DL (ref 23–92)
LDLC SERPL CALC-MCNC: 99 MG/DL
NONHDLC SERPL-MCNC: 123 MG/DL
TRIGL SERPL-MCNC: 118 MG/DL

## 2018-09-06 PROCEDURE — 80061 LIPID PANEL: CPT | Performed by: FAMILY MEDICINE

## 2018-09-06 PROCEDURE — G0463 HOSPITAL OUTPT CLINIC VISIT: HCPCS

## 2018-09-06 PROCEDURE — 82947 ASSAY GLUCOSE BLOOD QUANT: CPT | Performed by: FAMILY MEDICINE

## 2018-09-06 PROCEDURE — 99396 PREV VISIT EST AGE 40-64: CPT | Performed by: FAMILY MEDICINE

## 2018-09-06 PROCEDURE — 36415 COLL VENOUS BLD VENIPUNCTURE: CPT | Performed by: FAMILY MEDICINE

## 2018-09-06 RX ORDER — FLUOXETINE 10 MG/1
CAPSULE ORAL
Qty: 270 CAPSULE | Refills: 4 | Status: SHIPPED | OUTPATIENT
Start: 2018-09-06

## 2018-09-06 RX ORDER — ESTRADIOL 0.5 MG/1
0.5 TABLET ORAL DAILY
Qty: 90 TABLET | Refills: 4 | Status: SHIPPED | OUTPATIENT
Start: 2018-09-06

## 2018-09-06 RX ORDER — ALPRAZOLAM 1 MG
TABLET ORAL
Qty: 30 TABLET | Refills: 5 | Status: SHIPPED | OUTPATIENT
Start: 2018-09-06 | End: 2019-01-15

## 2018-09-06 ASSESSMENT — ANXIETY QUESTIONNAIRES
2. NOT BEING ABLE TO STOP OR CONTROL WORRYING: NOT AT ALL
GAD7 TOTAL SCORE: 4
3. WORRYING TOO MUCH ABOUT DIFFERENT THINGS: SEVERAL DAYS
6. BECOMING EASILY ANNOYED OR IRRITABLE: NOT AT ALL
5. BEING SO RESTLESS THAT IT IS HARD TO SIT STILL: SEVERAL DAYS
7. FEELING AFRAID AS IF SOMETHING AWFUL MIGHT HAPPEN: SEVERAL DAYS
1. FEELING NERVOUS, ANXIOUS, OR ON EDGE: NOT AT ALL

## 2018-09-06 ASSESSMENT — PATIENT HEALTH QUESTIONNAIRE - PHQ9: 5. POOR APPETITE OR OVEREATING: SEVERAL DAYS

## 2018-09-06 NOTE — LETTER
Lisa KANG Gomez  [address]  [facility] [address]    9/7/2018      Dear Ms. Dyer,      I wanted to let you know about your recent labs.    Your total cholesterol is >200, but that is mostly because your good cholesterol (or HDL cholesterol) is great at 85!  Nothing needed at this time, we will continue to monitor every 2-3 years.  Your blood sugar level is great at 70 as well!  No concerns for diabetes at this time.    Please contact us at [phone] with any questions or concerns that you have.    I have attached your lab results for your records.        Sincerely,     Sara Geiger     Resulted Orders   Lipid Panel   Result Value Ref Range    Cholesterol 208 (H) <200 mg/dL    Triglycerides 118 <150 mg/dL    HDL Cholesterol 85 23 - 92 mg/dL    LDL Cholesterol Calculated 99 <100 mg/dL      Comment:      Desirable:       <100 mg/dl    Non HDL Cholesterol 123 <130 mg/dL   Glucose   Result Value Ref Range    Glucose 70 70 - 105 mg/dL

## 2018-09-06 NOTE — NURSING NOTE
"Chief Complaint   Patient presents with     Physical       Initial /70 (BP Location: Right arm, Patient Position: Sitting, Cuff Size: Adult Regular)  Pulse 64  Ht 5' 5.75\" (1.67 m)  Wt 125 lb (56.7 kg)  BMI 20.33 kg/m2 Estimated body mass index is 20.33 kg/(m^2) as calculated from the following:    Height as of this encounter: 5' 5.75\" (1.67 m).    Weight as of this encounter: 125 lb (56.7 kg).  Medication Reconciliation: complete    Buffy Forman LPN  "

## 2018-09-06 NOTE — PROGRESS NOTES
"Nursing Notes:   Buffy Forman LPN  2018 10:08 AM  Signed  Chief Complaint   Patient presents with     Physical       Initial /70 (BP Location: Right arm, Patient Position: Sitting, Cuff Size: Adult Regular)  Pulse 64  Ht 5' 5.75\" (1.67 m)  Wt 125 lb (56.7 kg)  BMI 20.33 kg/m2 Estimated body mass index is 20.33 kg/(m^2) as calculated from the following:    Height as of this encounter: 5' 5.75\" (1.67 m).    Weight as of this encounter: 125 lb (56.7 kg).  Medication Reconciliation: complete    Buffy Forman LPN      ANNUAL PHYSICAL - FEMALE    HPI: patient who presents for a yearly exam.  Concerns include:  1. Has been weaning off estradiol.    2. Insomnia continues.  Using 0.5mg xanax at least at bedtime, may require a repeat dose.  Has been using for many years.  Knows she has developed a tolerance to it, but without it she will not sleep enough.  3. Has sold their house, and looking to move - possibly to the [facility].  Want a \"forever\" home to get older in.    No LMP recorded. Patient has had a hysterectomy.   Contraception: TVH/BSO for menorrhagia.  ON HRT.  Risk for STI?: No  Last pap: remote  Any hx of abnormal paps:  No  FH ofearly CA?: No  Cholesterol/DM concerns/screening: DUE  Tobacco?: No  Calcium intake: No  DEXA: @ 63  Last mammo: 2016; normal.  DUE  Colonoscopy: 2018; normal.  10 year follow up.  Immunizations: Tdap 2014; yearly flu vaccines; shingles DUE; pneumonia @ 63.    Patient Active Problem List   Diagnosis     Anxiety state     Controlled substance agreement signed     Hormone replacement therapy     Insomnia, persistent     Healthcare maintenance     Major depressive disorder       Past Medical History:   Diagnosis Date     Personal history of other mental and behavioral disorders     patient diagnosis/was told probably body dysmorphic syndrome       Past Surgical History:   Procedure Laterality Date     BUNIONECTOMY      right      SECTION  "     No Comments Provided     COLONOSCOPY N/A 4/30/2018    F/U 2028     HERNIA REPAIR Right     inguinal repair     HYSTERECTOMY VAGINAL  2013 7/2013,menorrhagia     OTHER SURGICAL HISTORY      [id number],OTHER,tendon repair     REPAIR TENDON FINGER(S)      right fourth finger      SALPINGO-OOPHORECTOMY BILATERAL  2014 4/2014,laparoscopic       Social History     Social History     Marital status:      Spouse name: N/A     Number of children: N/A     Years of education: N/A     Social History Main Topics     Smoking status: Never Smoker     Smokeless tobacco: Never Used     Alcohol use Yes      Comment: Alcoholic Drinks/day: some     Drug use: Not on file      Comment: Drug use: No     Sexual activity: Yes     Partners: Male     Other Topics Concern     Not on file     Social History Narrative    The patient is .  Her spouse is blind and has diabetes.  However, his blindness was from a hunting accident.  She has one daughter who lives with her.  She works at [employer]       Family History   Problem Relation Age of Onset     Hypertension Mother      Hypertension,angioplasty x 3     Other - See Comments Mother      occluded vein in leg/carotid endarterectomy     Diabetes Maternal Grandfather      Diabetes     HEART DISEASE Paternal Grandmother      Heart Disease     Thyroid Disease Maternal Aunt      Thyroid Disease,hypothyroid     Other - See Comments Brother      anxiety     Colon Cancer Paternal Aunt      Cancer-colon,age 62       Current Outpatient Prescriptions   Medication Sig Dispense Refill     ALPRAZolam (XANAX) 1 MG tablet TAKE 1 TABLET BY MOUTH NIGHTLY AT BEDTIME FOR INSOMNIA (Patient taking differently: TAKE 1/2 TABLET BY MOUTH NIGHTLY AT BEDTIME FOR INSOMNIA) 30 tablet 5     estradiol (ESTRACE) 0.5 MG tablet Take 0.5 mg by mouth       FLUoxetine (PROZAC) 10 MG capsule Take 3 capsules by mouth every morning.       zoster vaccine recombinant adjuvanted (SHINGRIX) injection Inject 0.5  "mLs into the muscle every 6 months for 2 doses 0.5 mL 1        REVIEW OF SYSTEMS:  Refer to HPI; all other systems reviewed and negative.    PHYSICAL EXAM:  /70 (BP Location: Right arm, Patient Position: Sitting, Cuff Size: Adult Regular)  Pulse 64  Ht 5' 5.75\" (1.67 m)  Wt 125 lb (56.7 kg)  BMI 20.33 kg/m2  CONSTITUTIONAL:  Alert, cooperative, NAD.  EYES: No scleral icterus.  PERRLA.  Conjunctiva clear.  ENT/MOUTH: External ears and nose normal.  TMs normal.  Moist mucous membranes. Oropharynx clear.    ENDO: No thyromegaly or thyroid nodules.  LYMPH:  No cervical or supraclavicular LA.    BREASTS: No skin abnormalities, no erythema.  No discrete masses.  No nipple discharge, no axillary, supra- or infraclavicular LA.   CARDIOVASCULAR: Regular, S1, S2.  No S3 or S4.  No murmur/gallop/rub.  No peripheral edema.  RESPIRATORY: CTA bilaterally, no wheezes, rhonchi or rales.  GI: Bowel sounds wnl.  Soft, nontender, non-distended.  No masses or HSM.  No rebound or guarding.  : Declines  Pap smear obtained: No  MSKEL: Grossly normal ROM.  No clubbing.  INTEGUMENTARY:Warm, dry.  No rash noted on exposed skin. + cherry angiomas and age spots scattered throughout trunk.  NEUROLOGIC: Facies symmetric.  Grossly normal movement and tone.  No tremor.  PSYCHIATRIC: Affect normal.  Speech fluent.      PHQ-9 SCORE 4/21/2016   Total Score 0       Results for orders placed or performed during the hospital encounter of 09/13/16   MA Screening Digital Bilateral    Narrative    EXAM: XR MAMMO BILAT SCREEN FFDM    COMPARISON: 7/30/15 through 2/19/09    FINDINGS:  The breast tissue is heterogeneously dense, which may obscure detection of small masses. No new architectural distortion, dominant masses or suspicious microcalcifications are identified in either breast.      Impression    No radiographic evidence of malignancy in either breast.    BI-RADS CODE:  1-Negative    RECOMMENDATION: Recommend routine mammographic screening " in one year.    Analysis by [employer] computer detection software version 9.3 was done.    Electronically Signed By: Mariusz Isidro on 9/14/2016 2:51 PM       ASSESSMENT/PLAN:  1. Routine history and physical examination of adult    2. Need for shingles vaccine  Rx given and will receive at pharmacy.  - zoster vaccine recombinant adjuvanted (SHINGRIX) injection; Inject 0.5 mLs into the muscle every 6 months for 2 doses  Dispense: 0.5 mL; Refill: 1    3. Lipid screening  Screening labs today.  - Lipid Panel; Future    4. Screening for diabetes mellitus  Screening labs today.  - Glucose; Future    5. Insomnia, unspecified type  Chronic, stable.  Continue with up to 1mg tablet at bedtime for insomnia.  Rx x 6 months provided; would be stable x 1 year.  - ALPRAZolam (XANAX) 1 MG tablet; TAKE 1 TABLET BY MOUTH NIGHTLY AT BEDTIME FOR INSOMNIA  Dispense: 30 tablet; Refill: 5    6. Symptomatic menopausal or female climacteric states  Chronic, weaning.  Most problematic is the vaginal dryness/dysparuenia.  She will continue to attempt weaning from the oral estrogen, and options of more topical/PV estrogen reviewed today.  She will consider.  Otherwise, OTC formulations, lubrication and positions during intercourse that cause less pain - are recommended.  - estradiol (ESTRACE) 0.5 MG tablet; Take 1 tablet (0.5 mg) by mouth daily  Dispense: 90 tablet; Refill: 4    7. Anxiety state  Chronic, stable.  Renewed prozac at prior 30mg daily dosing.   - FLUoxetine (PROZAC) 10 MG capsule; Take 3 capsules by mouth every morning.  Dispense: 270 capsule; Refill: 4    8. Moderate episode of recurrent major depressive disorder (H)  Chronic, stable as above.  - FLUoxetine (PROZAC) 10 MG capsule; Take 3 capsules by mouth every morning.  Dispense: 270 capsule; Refill: 4    9. Encounter for screening mammogram for breast cancer  Mammogram ordered - will be set up at a time she is in the  area; as she is currently staying with a brother in  the [place] area while they are between housing situations.  - MA Screen Bilateral w/Gilberto; Future      Relevant cancer screening discussed.    Counseled on healthy diet, Calcium and vitamin D intake, and exercise.    Sara Geiger

## 2018-09-06 NOTE — MR AVS SNAPSHOT
After Visit Summary   9/6/2018    Lisa Dyer    MRN: [id number]           Patient Information     Date Of Birth          1970        Visit Information        Provider Department      9/6/2018 10:00 AM Sara Geiger DO [facility]        Today's Diagnoses     Routine history and physical examination of adult    -  1    Need for shingles vaccine        Lipid screening        Screening for diabetes mellitus        Insomnia, unspecified type        Symptomatic menopausal or female climacteric states        Anxiety state        Moderate episode of recurrent major depressive disorder (H)        Encounter for screening mammogram for breast cancer          Care Instructions    PREVENTIVE HEALTH RECOMMENDATIONS:   Most women need a yearly breast and pelvic exam.    A PAP screen, a test done DURING a pelvic exam, is NO longer recommended yearly.    March 2013, screening guidelines recommended by ACOG for PAP screen are:    1) First pap at age 21.    2) Pap every 3 years until age 30.    3) After age 30, pap every 3 years or Pap with HR HPV screen every 5 years until age 65.  4) Women do NOT need a vaginal Pap screen after a hysterectomy (surgical removal of the uterus) when they have not had cancer.    Exceptions:  1) Yearly pap if HIV+ or immunosuppressed secondary to organ transplant  2) KIRSTEN II-III continue routine screening for 20 years.    I encourage you continue looking for opportunities to choose a healthy lifestyle:       * Choose to eat a heart healthy diet. Check out the FOOD PLATE guidelines at: http://www.choosemyplate.gov/ for helpful hints on weight and cholesterol management.  Balance your caloric intake with exercise to maintain a BMI in the 22 to 26 range. For bone health: Eat calcium-rich foods like yogurt, broccoli or take chewable calcium pills (500 to 600 mg) twice a day with food.       * Exercise for at least an average of 30 minutes a day, 5 days of the  week. This will help you control your weight, release stress, and help prevent disease.      * Take a Vitamin D3 supplement daily fall through spring and during summer unless you [id number] full body sun exposure to skin without sunscreen.      * DO wear sunscreen to prevent skin cancer after the first 15-30 minutes.      * Identify stressors in your life, find ways to release the stress, and, make time for yourself. PLEASE ask for help if mood changes last longer than two weeks.     * Limit alcohol to one drink per day.  No smoking.  Avoid second hand smoke. If you smoke, ask for help to stop.       *  If you are in a sexual relationship, talk with your partner about possible infection risks and take action to protect yourself from exposure to a sexual infection.    Please request an infection screen for STIs (sexually transmitted infections) if you are less than age 26 OR believe that you may be at risk.     Get a flu shot each year. Get a tetanus shot every 10 years. EVERYONE needs a pertussis (Whooping cough) booster.    See your dentist twice a year for an exam and preventive care cleaning.     Consider the following screen tests:    1) cholesterol test every 5 years.     2) yearly mammogram after age 40 unless you have identified risks.    3) colonoscopy every 10 years after age 50 unless you have identified risks.    4) diabetes blood test screening if you are at risk for diabetes.      Additional information that you may also find helpful:  The Internet now gives us access to LOTS of information -- some of it helpful, research documented and also plenty of harmful, anecdotal information that may not pertain to your situtaion. Consider visiting the following websites for accurate health information:    www.vitamindcouncil.org/ : Info and ongoing research re Vitamin D    www.fairview.org : Up to date and easily searchable information on multiple topics.    www.medlineplus.gov : medication info, interactive  "tutorials, watch real surgeries online    www.cdc.gov : public health info, travel advisories, epidemics (H1N1)    www.rachelle/std.org: current research re diagnosis, treatment and prevention of sexually contacted infections.    www.health.state.[place] : [place] dept of heat, public health issues in [place], N1N1    www.familydoctor.org : good info from the Academy of Family Physicians                Follow-ups after your visit        Future tests that were ordered for you today     Open Future Orders        Priority Expected Expires Ordered    MA Screen Bilateral w/Gilberto Routine  9/6/2019 9/6/2018    Lipid Panel Routine  9/6/2019 9/6/2018    Glucose Routine  9/6/2019 9/6/2018            Who to contact     If you have questions or need follow up information about today's clinic visit or your schedule please contact [facility] directly at [phone].  Normal or non-critical lab and imaging results will be communicated to you by MyChart, letter or phone within 4 business days after the clinic has received the results. If you do not hear from us within 7 days, please contact the clinic through MyChart or phone. If you have a critical or abnormal lab result, we will notify you by phone as soon as possible.  Submit refill requests through [employer] or call your pharmacy and they will forward the refill request to us. Please allow 3 business days for your refill to be completed.          Additional Information About Your Visit        Care EveryWhere ID     This is your Care EveryWhere ID. This could be used by other organizations to access your [facility] medical records  [id number]        Your Vitals Were     Pulse Height BMI (Body Mass Index)             64 5' 5.75\" (1.67 m) 20.33 kg/m2          Blood Pressure from Last 3 Encounters:   09/06/18 102/70   04/30/18 120/71   08/09/17 100/58    Weight from Last 3 Encounters:   09/06/18 125 lb (56.7 kg)   04/30/18 120 lb (54.4 kg)   08/09/17 129 lb 6.4 oz (58.7 kg) "                 Today's Medication Changes          These changes are accurate as of 9/6/18 10:39 AM.  If you have any questions, ask your nurse or doctor.               Start taking these medicines.        Dose/Directions    zoster vaccine recombinant adjuvanted injection   Commonly known as:  SHINGRIX   Used for:  Need for shingles vaccine   Started by:  Sara Geiger DO        Dose:  1 each   Inject 0.5 mLs into the muscle every 6 months for 2 doses   Quantity:  0.5 mL   Refills:  1         These medicines have changed or have updated prescriptions.        Dose/Directions    ALPRAZolam 1 MG tablet   Commonly known as:  XANAX   This may have changed:  See the new instructions.   Used for:  Insomnia, unspecified type        TAKE 1 TABLET BY MOUTH NIGHTLY AT BEDTIME FOR INSOMNIA   Quantity:  30 tablet   Refills:  5       estradiol 0.5 MG tablet   Commonly known as:  ESTRACE   This may have changed:  when to take this   Used for:  Symptomatic menopausal or female climacteric states   Changed by:  Sara Geiger DO        Dose:  0.5 mg   Take 1 tablet (0.5 mg) by mouth daily   Quantity:  90 tablet   Refills:  4            Where to get your medicines      These medications were sent to [id number] - [place], [place] - [address]  [address] [facility] [address]     Phone:  [phone]     estradiol 0.5 MG tablet    FLUoxetine 10 MG capsule         Some of these will need a paper prescription and others can be bought over the counter.  Ask your nurse if you have questions.     Bring a paper prescription for each of these medications     ALPRAZolam 1 MG tablet    zoster vaccine recombinant adjuvanted injection                Primary Care Provider Office Phone # Fax #    Sara Geiger [phone] [address]  [facility] [address]        Equal Access to Services     SAMIA ZARAGOZA AH: matilda Hammonds qaybta  bettie josérambo robbins ah. [facility] [phone].    ATENCIÓN: Si emely rizo, tiene a boswell disposición servicios gratuitos de asistencia lingüística. Roni al [phone].    We comply with applicable federal civil rights laws and [place] laws. We do not discriminate on the basis of race, color, national origin, age, disability, sex, sexual orientation, or gender identity.            Thank you!     Thank you for choosing [facility]  for your care. Our goal is always to provide you with excellent care. Hearing back from our patients is one way we can continue to improve our services. Please take a few minutes to complete the written survey that you may receive in the mail after your visit with us. Thank you!             Your Updated Medication List - Protect others around you: Learn how to safely use, store and throw away your medicines at www.disposemymeds.org.          This list is accurate as of 9/6/18 10:39 AM.  Always use your most recent med list.                   Brand Name Dispense Instructions for use Diagnosis    ALPRAZolam 1 MG tablet    XANAX    30 tablet    TAKE 1 TABLET BY MOUTH NIGHTLY AT BEDTIME FOR INSOMNIA    Insomnia, unspecified type       estradiol 0.5 MG tablet    ESTRACE    90 tablet    Take 1 tablet (0.5 mg) by mouth daily    Symptomatic menopausal or female climacteric states       FLUoxetine 10 MG capsule    PROzac    270 capsule    Take 3 capsules by mouth every morning.    Anxiety state, Moderate episode of recurrent major depressive disorder (H)       zoster vaccine recombinant adjuvanted injection    SHINGRIX    0.5 mL    Inject 0.5 mLs into the muscle every 6 months for 2 doses    Need for shingles vaccine

## 2018-09-06 NOTE — PATIENT INSTRUCTIONS

## 2018-09-07 ASSESSMENT — PATIENT HEALTH QUESTIONNAIRE - PHQ9: SUM OF ALL RESPONSES TO PHQ QUESTIONS 1-9: 3

## 2018-09-07 ASSESSMENT — ANXIETY QUESTIONNAIRES: GAD7 TOTAL SCORE: 4

## 2018-10-17 ENCOUNTER — HOSPITAL ENCOUNTER (OUTPATIENT)
Dept: MAMMOGRAPHY | Facility: OTHER | Age: 48
Discharge: HOME | End: 2018-10-17
Attending: FAMILY MEDICINE | Admitting: FAMILY MEDICINE
Payer: COMMERCIAL

## 2018-10-17 DIAGNOSIS — Z12.31 ENCOUNTER FOR SCREENING MAMMOGRAM FOR BREAST CANCER: ICD-10-CM

## 2018-10-17 PROCEDURE — 77063 BREAST TOMOSYNTHESIS BI: CPT

## 2019-01-11 DIAGNOSIS — G47.00 INSOMNIA, UNSPECIFIED TYPE: ICD-10-CM

## 2019-01-14 RX ORDER — ALPRAZOLAM 1 MG
TABLET ORAL
Qty: 30 TABLET | Refills: 0 | OUTPATIENT
Start: 2019-01-14

## 2019-01-14 NOTE — TELEPHONE ENCOUNTER
Filled 9/6/18 #30 x 5 to [facility]. Call placed to [facility] in [place] and spoke with Pharmacist GIOVANI who will contact patient to be sure she is requesting a transfer of this Rx.  If so will contact [facility] and obtain transfer.    Disp Refills Start End TRDUY   ALPRAZolam (XANAX) 1 MG tablet 30 tablet 5 9/6/2018  No   Sig: TAKE 1 TABLET BY MOUTH NIGHTLY AT BEDTIME FOR INSOMNIA   Class: Local Print   Order: [id number]   Printout Tracking     External Result Report   Medication Administration Instructions     TAKE 1 TABLET BY MOUTH NIGHTLY AT BEDTIME FOR INSOMNIA   Pharmacy     [id number] - [place], [place] - [address]     Unable to complete prescription refill per RNMedication Refill Policy.................... Ellie Hyatt ....................  1/14/2019   9:36 AM

## 2019-01-15 ENCOUNTER — TELEPHONE (OUTPATIENT)
Dept: FAMILY MEDICINE | Facility: OTHER | Age: 49
End: 2019-01-15

## 2019-01-15 DIAGNOSIS — G47.00 INSOMNIA, UNSPECIFIED TYPE: ICD-10-CM

## 2019-01-15 RX ORDER — ALPRAZOLAM 1 MG
TABLET ORAL
Qty: 30 TABLET | Refills: 5 | Status: SHIPPED | OUTPATIENT
Start: 2019-01-15

## 2019-01-15 NOTE — TELEPHONE ENCOUNTER
Please call patient regarding Alprazolam Rx getting transferred to [facility]. [phone], if not at cell.

## 2019-01-15 NOTE — TELEPHONE ENCOUNTER
Rx printed and in outbox.  Able to give 6 month supply; but then will need new Rx by local provider.    Sara Geiger, DO

## 2019-01-15 NOTE — TELEPHONE ENCOUNTER
Spoke with Lisa and she states that her and her  moved to the [facility] and all her other prescriptions transferred over except her Xanax. She is needing a new prescription faxed to [facility] in [place]. Please advise.  Buffy Trejo............................... 1/15/2019 1:34 PM

## 2019-09-16 DIAGNOSIS — N95.1 SYMPTOMATIC MENOPAUSAL OR FEMALE CLIMACTERIC STATES: ICD-10-CM

## 2019-09-18 RX ORDER — ESTRADIOL 0.5 MG/1
TABLET ORAL
Qty: 330 TABLET | Refills: 0 | OUTPATIENT
Start: 2019-09-18

## 2019-09-18 NOTE — TELEPHONE ENCOUNTER
Called and spoke with patient she states they have moved to cities and she will call pharmacy and have them send to her new PCP.    Joselin Soto RN on 9/18/2019 at 2:03 PM

## 2019-09-30 DIAGNOSIS — F41.1 ANXIETY STATE: ICD-10-CM

## 2019-09-30 DIAGNOSIS — F33.1 MODERATE EPISODE OF RECURRENT MAJOR DEPRESSIVE DISORDER (H): ICD-10-CM

## 2019-10-03 RX ORDER — FLUOXETINE 10 MG/1
CAPSULE ORAL
Qty: 990 CAPSULE | Refills: 0 | OUTPATIENT
Start: 2019-10-03

## 2019-10-03 NOTE — TELEPHONE ENCOUNTER
Called and spoke with patient she states they have moved to cities and she will call pharmacy and have them send to her new PCP    Joselin Soto RN on 10/3/2019 at 3:55 PM

## (undated) DEVICE — SOL WATER 1500ML

## (undated) DEVICE — ENDO BRUSH CHANNEL MASTER CLEANING 2-4.2MM BW-412T

## (undated) DEVICE — ESU GROUND PAD ADULT W/CORD E7507

## (undated) DEVICE — ENDO KIT COMPLIANCE DYKENDOCMPLY

## (undated) DEVICE — ENDO FORCEP ENDOJAW BIOPSY 2.8MMX230CM FB-220U

## (undated) DEVICE — SUCTION MANIFOLD NEPTUNE 2 SYS 4 PORT 0702-020-000

## (undated) DEVICE — TUBING SUCTION 10'X3/16" N510

## (undated) RX ORDER — LIDOCAINE HYDROCHLORIDE 20 MG/ML
INJECTION, SOLUTION EPIDURAL; INFILTRATION; INTRACAUDAL; PERINEURAL
Status: DISPENSED
Start: 2018-04-30

## (undated) RX ORDER — PROPOFOL 10 MG/ML
INJECTION, EMULSION INTRAVENOUS
Status: DISPENSED
Start: 2018-04-30